# Patient Record
Sex: MALE | Race: WHITE | Employment: STUDENT | URBAN - METROPOLITAN AREA
[De-identification: names, ages, dates, MRNs, and addresses within clinical notes are randomized per-mention and may not be internally consistent; named-entity substitution may affect disease eponyms.]

---
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
## 2021-05-13 ENCOUNTER — APPOINTMENT (OUTPATIENT)
Dept: CT IMAGING | Age: 21
End: 2021-05-13
Attending: EMERGENCY MEDICINE
Payer: COMMERCIAL

## 2021-05-13 ENCOUNTER — HOSPITAL ENCOUNTER (EMERGENCY)
Age: 21
Discharge: HOME | End: 2021-05-13
Attending: EMERGENCY MEDICINE
Payer: COMMERCIAL

## 2021-05-13 VITALS
HEIGHT: 72 IN | BODY MASS INDEX: 20.99 KG/M2 | TEMPERATURE: 97 F | SYSTOLIC BLOOD PRESSURE: 133 MMHG | HEART RATE: 53 BPM | DIASTOLIC BLOOD PRESSURE: 85 MMHG | OXYGEN SATURATION: 99 % | RESPIRATION RATE: 18 BRPM | WEIGHT: 155 LBS

## 2021-05-13 DIAGNOSIS — G43.809 OTHER MIGRAINE WITHOUT STATUS MIGRAINOSUS, NOT INTRACTABLE: Primary | ICD-10-CM

## 2021-05-13 PROCEDURE — 80053 COMPREHEN METABOLIC PANEL: CPT | Performed by: EMERGENCY MEDICINE

## 2021-05-13 PROCEDURE — 85025 COMPLETE CBC W/AUTO DIFF WBC: CPT | Performed by: EMERGENCY MEDICINE

## 2021-05-13 PROCEDURE — 99284 EMERGENCY DEPT VISIT MOD MDM: CPT | Performed by: EMERGENCY MEDICINE

## 2021-05-13 PROCEDURE — 70450 CT HEAD/BRAIN W/O DYE: CPT | Performed by: EMERGENCY MEDICINE

## 2021-05-13 PROCEDURE — 96375 TX/PRO/DX INJ NEW DRUG ADDON: CPT | Performed by: EMERGENCY MEDICINE

## 2021-05-13 PROCEDURE — 96374 THER/PROPH/DIAG INJ IV PUSH: CPT | Performed by: EMERGENCY MEDICINE

## 2021-05-13 PROCEDURE — 96361 HYDRATE IV INFUSION ADD-ON: CPT | Performed by: EMERGENCY MEDICINE

## 2021-05-13 RX ORDER — METOCLOPRAMIDE HYDROCHLORIDE 5 MG/ML
10 INJECTION INTRAMUSCULAR; INTRAVENOUS ONCE
Status: COMPLETED | OUTPATIENT
Start: 2021-05-13 | End: 2021-05-13

## 2021-05-13 RX ORDER — BUTALBITAL, ACETAMINOPHEN AND CAFFEINE 50; 325; 40 MG/1; MG/1; MG/1
1-2 TABLET ORAL EVERY 6 HOURS PRN
Qty: 10 TABLET | Refills: 0 | Status: SHIPPED | OUTPATIENT
Start: 2021-05-13 | End: 2021-05-20

## 2021-05-13 RX ORDER — DIPHENHYDRAMINE HYDROCHLORIDE 50 MG/ML
25 INJECTION INTRAMUSCULAR; INTRAVENOUS ONCE
Status: COMPLETED | OUTPATIENT
Start: 2021-05-13 | End: 2021-05-13

## 2021-05-13 RX ORDER — KETOROLAC TROMETHAMINE 15 MG/ML
15 INJECTION, SOLUTION INTRAMUSCULAR; INTRAVENOUS ONCE
Status: COMPLETED | OUTPATIENT
Start: 2021-05-13 | End: 2021-05-13

## 2021-05-13 RX ORDER — ONDANSETRON 2 MG/ML
4 INJECTION INTRAMUSCULAR; INTRAVENOUS ONCE
Status: COMPLETED | OUTPATIENT
Start: 2021-05-13 | End: 2021-05-13

## 2021-05-13 NOTE — ED PROVIDER NOTES
Patient Seen in: [facility] In [place]      History   Patient presents with:  Headache  Nausea/Vomiting/Diarrhea    Stated Complaint: c/o headache since yesterday, vomiting today.     HPI/Subjective:   HPI    31-year-old male presents Extraocular Movements: Extraocular movements intact. Conjunctiva/sclera: Conjunctivae normal.      Comments: Pupils equal, anicteric   Cardiovascular:      Rate and Rhythm: Normal rate and regular rhythm. Pulses: Normal pulses.       Heart sounds MD on 5/13/2021 at 9:48 AM     Finalized by (CST): Waqar Vargas MD on 5/13/2021 at 9:50 AM              MDM       Diff dx: migraine HA, tension HA; no red flags afebrile, no menigismus    CT head neg, labs nl, reglan/benadryl and toradol, felt much b

## 2021-05-13 NOTE — ED INITIAL ASSESSMENT (HPI)
Pt states he started having a headache yesterday and throughout the night worsening vomiting this morning. Noticed some blood in vomit this morning. No diarrhea or fevers. No hx of headaches.

## 2021-05-30 ENCOUNTER — WALK IN (OUTPATIENT)
Dept: URGENT CARE | Age: 21
End: 2021-05-30

## 2021-05-30 VITALS
TEMPERATURE: 99.4 F | SYSTOLIC BLOOD PRESSURE: 104 MMHG | RESPIRATION RATE: 16 BRPM | HEART RATE: 70 BPM | DIASTOLIC BLOOD PRESSURE: 80 MMHG

## 2021-05-30 DIAGNOSIS — B27.90 INFECTIOUS MONONUCLEOSIS WITHOUT COMPLICATION, INFECTIOUS MONONUCLEOSIS DUE TO UNSPECIFIED ORGANISM: ICD-10-CM

## 2021-05-30 DIAGNOSIS — J03.90 EXUDATIVE TONSILLITIS: Primary | ICD-10-CM

## 2021-05-30 LAB — HETEROPH AB SER QL: POSITIVE

## 2021-05-30 PROCEDURE — 86308 HETEROPHILE ANTIBODY SCREEN: CPT | Performed by: NURSE PRACTITIONER

## 2021-05-30 PROCEDURE — 99203 OFFICE O/P NEW LOW 30 MIN: CPT | Performed by: NURSE PRACTITIONER

## 2021-05-30 RX ORDER — AZITHROMYCIN 250 MG/1
TABLET, FILM COATED ORAL
Qty: 6 TABLET | Refills: 0 | Status: SHIPPED | OUTPATIENT
Start: 2021-05-30

## 2021-05-30 RX ORDER — PREDNISONE 20 MG/1
TABLET ORAL
Qty: 10 TABLET | Refills: 0 | Status: SHIPPED | OUTPATIENT
Start: 2021-05-30

## 2021-05-30 ASSESSMENT — ENCOUNTER SYMPTOMS
FATIGUE: 1
SORE THROAT: 1
DIZZINESS: 0
HEADACHES: 0
NAUSEA: 0
ABDOMINAL PAIN: 0
CHILLS: 0
VOMITING: 0
FEVER: 0

## 2025-05-23 ENCOUNTER — OFFICE VISIT (OUTPATIENT)
Dept: OCCUPATIONAL MEDICINE | Age: 25
End: 2025-05-23

## 2025-05-23 DIAGNOSIS — Z02.89 VISIT FOR OCCUPATIONAL HEALTH EXAMINATION: Primary | ICD-10-CM
